# Patient Record
Sex: MALE | Race: WHITE | NOT HISPANIC OR LATINO
[De-identification: names, ages, dates, MRNs, and addresses within clinical notes are randomized per-mention and may not be internally consistent; named-entity substitution may affect disease eponyms.]

---

## 2018-07-17 ENCOUNTER — APPOINTMENT (OUTPATIENT)
Dept: DERMATOLOGY | Facility: CLINIC | Age: 53
End: 2018-07-17
Payer: COMMERCIAL

## 2018-07-17 PROBLEM — Z00.00 ENCOUNTER FOR PREVENTIVE HEALTH EXAMINATION: Status: ACTIVE | Noted: 2018-07-17

## 2018-07-17 PROCEDURE — 17000 DESTRUCT PREMALG LESION: CPT | Mod: 59

## 2018-07-17 PROCEDURE — 99203 OFFICE O/P NEW LOW 30 MIN: CPT | Mod: 25

## 2018-07-17 PROCEDURE — 17110 DESTRUCTION B9 LES UP TO 14: CPT

## 2022-04-17 ENCOUNTER — EMERGENCY (EMERGENCY)
Facility: HOSPITAL | Age: 57
LOS: 1 days | Discharge: HOME | End: 2022-04-17
Attending: EMERGENCY MEDICINE
Payer: COMMERCIAL

## 2022-04-17 VITALS
DIASTOLIC BLOOD PRESSURE: 87 MMHG | RESPIRATION RATE: 16 BRPM | HEART RATE: 75 BPM | HEIGHT: 63 IN | SYSTOLIC BLOOD PRESSURE: 170 MMHG | TEMPERATURE: 98 F | WEIGHT: 149.91 LBS | OXYGEN SATURATION: 100 %

## 2022-04-17 PROCEDURE — 99284 EMERGENCY DEPT VISIT MOD MDM: CPT

## 2022-04-17 PROCEDURE — 73030 X-RAY EXAM OF SHOULDER: CPT

## 2022-04-17 PROCEDURE — 73030 X-RAY EXAM OF SHOULDER: CPT | Mod: 26,RT

## 2022-04-17 PROCEDURE — 73060 X-RAY EXAM OF HUMERUS: CPT | Mod: 26,RT

## 2022-04-17 PROCEDURE — 73060 X-RAY EXAM OF HUMERUS: CPT

## 2022-04-17 PROCEDURE — 99284 EMERGENCY DEPT VISIT MOD MDM: CPT | Mod: 25

## 2022-04-17 NOTE — ED STATDOCS - ATTENDING CONTRIBUTION TO CARE
I, Mickie Jaime, performed the initial face to face bedside interview with this patient regarding history of present illness, review of symptoms and relevant past medical, social and family history.  I completed an independent physical examination.  I was the initial provider who evaluated this patient. I have signed out the follow up of any pending tests (i.e. labs, radiological studies) to the ACP.  I have communicated the patient’s plan of care and disposition with the ACP.

## 2022-04-17 NOTE — ED ADULT NURSE NOTE - OBJECTIVE STATEMENT
Pt is a 57YOM who is here for right shoulder pain, pt states that yesterday he hit his right arm on a steel door and today he is showing signs of bruising, pt has ROM of the extremity but has some bruising to the area today, pt denies any sensory loss.

## 2022-04-17 NOTE — ED ADULT TRIAGE NOTE - CHIEF COMPLAINT QUOTE
pt c/o right shoulder pain inflammation bruising increasing in severity after walking into the steal frame pt on blood thinners states arm increased in size and is turning purple

## 2022-04-17 NOTE — ED STATDOCS - PHYSICAL EXAMINATION
Head: atraumatic, normacephalic  Face: atraumatic, no crepitus no orbiral/maxillary/mandibular ttp  throat: uvula midline no exudates  eyes: perrla eomi  heart: rrr s1s2  lungs: ctab  abd: soft, nt nd +bs no rebound/guarding no cva ttp  skin: warm  UE: Full ROM, no point tenderness, ecchymosis from shoulder to forearm area. 2+ pulses bilaterally   LE: no swelling, no calf ttp  back: no midline cervical/thoracic/lumbar ttp Head: atraumatic, normacephalic  Face: atraumatic, no crepitus no orbiral/maxillary/mandibular ttp  throat: uvula midline no exudates  eyes: perrla eomi  heart: rrr s1s2  lungs: ctab  abd: soft, nt nd +bs no rebound/guarding no cva ttp  skin: warm  UE: Full ROM, no point tenderness, ecchymosis from shoulder to forearm area. 2+ pulses bilaterally  sensation intact  LE: no swelling, no calf ttp  back: no midline cervical/thoracic/lumbar ttp

## 2022-04-17 NOTE — ED STATDOCS - NSFOLLOWUPINSTRUCTIONS_ED_ALL_ED_FT
please follow with orthopedics and with primary care doctor.   monitor for new or worsening symptoms.

## 2022-04-17 NOTE — ED STATDOCS - OBJECTIVE STATEMENT
56 y/o male with PMHx of HTN, DM, high cholesterol, on insulin ,c/o shoulder pain. Patient reports banging his right arm into a steel door last night and noticed having a large bruise this morning. Denies LOC or any other injury. Patient states arm is sore but is able to move to arm/shoulder. Patient came to ER due taking blood thinners but is unable to recall the name. When listing the medication names patient denies taking Aspirin, Coumadin, Eliquis, Xarelto, or Plavix. Daljit hx of TIA, DVT or PE. No allergies or surgical hx.   Denies f/c/n/v/cp/sob/palpitations/cough/rash/headache/dizziness/abd.pain/d/c/dysuria/hematuria 58 y/o male with PMHx of HTN, DM, high cholesterol, on insulin ,c/o right shoulder pain. Patient reports banging his right arm into a steel door last night and noticed having a large bruise this morning. Denies LOC or any other injury. Patient states arm is sore but is able to move to arm/shoulder. Patient came to ER due taking blood thinners but is unable to recall the name. When listing the medication names patient denies taking Aspirin, Coumadin, Eliquis, Xarelto, or Plavix. Daljit hx of TIA, DVT or PE. No allergies or surgical hx.   Denies f/c/n/v/cp/sob/palpitations/cough/rash/headache/dizziness/abd.pain/d/c/dysuria/hematuria

## 2022-04-17 NOTE — ED STATDOCS - NS ED ATTENDING STATEMENT MOD
This was a shared visit with the JAIME. I reviewed and verified the documentation and independently performed the documented:

## 2022-04-17 NOTE — ED STATDOCS - PROGRESS NOTE DETAILS
JANNIE CASILLAS: PT evaluated by intake physician. HPI/PE/ROS as noted above. Will follow up plan per intake physician   no ac. from of extremity. prelim no fracture on film,  dc with fu with orthopedics

## 2022-04-17 NOTE — ED STATDOCS - PATIENT PORTAL LINK FT
You can access the FollowMyHealth Patient Portal offered by [facility] by registering at the following website: [email or web address]. By joining [employer]’s FollowMyHealth portal, you will also be able to view your health information using other applications (apps) compatible with our system.

## 2022-04-17 NOTE — ED STATDOCS - CLINICAL SUMMARY MEDICAL DECISION MAKING FREE TEXT BOX
Shoulder injury with ecchymosis. Patient came to ER for evaluation due to possible anticoagulant medication. Full ROM and no point tenderness. No anticoagulant on medication list, will call pharmacy to see if there is anticoagulant medication. Will get XR of right arm and shoulder. Most likely contusion.

## 2022-04-26 ENCOUNTER — APPOINTMENT (OUTPATIENT)
Dept: ORTHOPEDIC SURGERY | Facility: CLINIC | Age: 57
End: 2022-04-26
Payer: OTHER MISCELLANEOUS

## 2022-04-26 VITALS — WEIGHT: 200 LBS | BODY MASS INDEX: 34.15 KG/M2 | HEIGHT: 64 IN

## 2022-04-26 DIAGNOSIS — E11.9 TYPE 2 DIABETES MELLITUS W/OUT COMPLICATIONS: ICD-10-CM

## 2022-04-26 DIAGNOSIS — Z78.9 OTHER SPECIFIED HEALTH STATUS: ICD-10-CM

## 2022-04-26 PROCEDURE — 99204 OFFICE O/P NEW MOD 45 MIN: CPT

## 2022-04-26 PROCEDURE — 99072 ADDL SUPL MATRL&STAF TM PHE: CPT

## 2022-04-27 ENCOUNTER — APPOINTMENT (OUTPATIENT)
Dept: MRI IMAGING | Facility: CLINIC | Age: 57
End: 2022-04-27
Payer: OTHER MISCELLANEOUS

## 2022-04-27 PROCEDURE — 73221 MRI JOINT UPR EXTREM W/O DYE: CPT | Mod: RT

## 2022-04-27 PROCEDURE — 99072 ADDL SUPL MATRL&STAF TM PHE: CPT

## 2022-04-27 NOTE — DISCUSSION/SUMMARY
[de-identified] : Follow up after MRI of right shoulder to eval rotator cuff tear and proximal biceps tear \par No work until further notice\par Referred patient to physical therapy for strengthening. \par \par Therm-X\par Due to this patient expressing significant pain, I am prescribing an iceless cold/heat compression therapy device for at home use to be used 3-5 times per day at 40 degrees for 35 days. I would like my patient to begin with simultaneous cold & compression therapy at 10mm pressure. At the patients follow up I will determine whether they should continue with cold, or if they should transition to contrast cold/heat compression therapy. Unlike a conventional cold therapy unit that requires ice, the ThermX iceless device is set to a prescribed temperature that it will remain throughout the entire duration of use, whether that be cold compression, heat compression, or contrast compression. Cold therapy units that depend on ice melt over a very short period and do not provide compression which limits the compliance and effectiveness for pain/inflammation reduction that I am targeting for my patient. I have reached out to [facility] to supply this device as they are the exclusive provider of the ThermX and the patient will be contacted and instructed on how to utilize the device.\par  \par \par \par The patient is instructed on a home exercise program.\par \par OTONIEL VIZCARRA Acting as a Scribe for Dr. Yates\par I, Otoniel Vizcarra, attest that this documentation has been prepared under the direction and in the presence of Provider Fransisco Yates MD.\par \par Activity Modification\par The patient was advised to modify their activities.\par \par Dx / Natural History\par The patient was advised of the diagnosis.  The natural history of the pathology was explained in full to the patient in layman's terms.  Several different treatment options were discussed and explained in full to the patient including the risks and benefits of both surgical and non-surgical treatments.  All questions and concerns were answered.\par \par Pain Guide Activities\par The patient was advised to let pain guide the gradual advancement of activities.\par \par RICE\par I explained to the patient that rest, ice, compression, and elevation would benefit them.  They may return to activity after follow-up or when they no longer have any pain.

## 2022-04-27 NOTE — HISTORY OF PRESENT ILLNESS
[de-identified] : The patient is a 57 year old R hand dominant M who presents today complaining of RIGHT SHOULDER PAIN.  Fell down steps at work and his right bicep hit a handrail. Immediate pain and swelling. next day with significant ecchymosis, complains of lump in bicep.\par Date of Injury/Onset: WC - 4/16/2022\par Pain:    At Rest: 5/10 \par With Activity:  10/10 \par Mechanism of injury: WC - FELL DOWN THE STAIRS AT WORK AND SLAMMED INTO DOOR FRAME \par This is NOT a Work Related InjUry being treated under Worker's Compensation.\par This is NOT an athletic injury occurring associated with an interscholastic or organized sports team.\par Quality of symptoms: SHARP , ACHING , THROBBING\par Improves with: REST \par Worse with: LIFTING , REACHING \par Reports Available For Review Today: _\par Out of work/sport: _, since _\par School/Sport/Position/Occupation:  \par Change since last visit: \par Additional Information: None\par  \par

## 2022-04-27 NOTE — PHYSICAL EXAM
[de-identified] : Neurologic: normal coordination, normal DTR UE/LE , normal sensation, normal mood and affect, orientated and able to communicate. \par Skin: normal skin, no rash, no ulcers and no lesions. \par Lymphatic: no obvious lymphadenopathy in areas examined. \par Constitutional: well developed and well nourished. \par Cardiovascular: peripheral vascular exam is grossly normal. \par Pulmonary: no respiratory distress, lungs clear to auscultation bilaterally. \par Abdomen: normal bowel sounds, non-tender, no HSM and no mass.\par \par Right Shoulder: Palpable doris deformity\par Proximal biceps swelling and pain\par Abduction: 90 degrees\par Forward Flexion: 90 degrees\par +neer\par +ruff\par +impingement \par \par Worker's Comp:\par The percentage of impairment is total. The patient cannot return to work. Board authorized health care provider. I provided the services listed above. \par x-ray rt shoulder: 2 views: [place]: normal \par

## 2022-05-03 ENCOUNTER — APPOINTMENT (OUTPATIENT)
Dept: ORTHOPEDIC SURGERY | Facility: CLINIC | Age: 57
End: 2022-05-03
Payer: OTHER MISCELLANEOUS

## 2022-05-03 VITALS — BODY MASS INDEX: 34.15 KG/M2 | HEIGHT: 64 IN | WEIGHT: 200 LBS

## 2022-05-03 DIAGNOSIS — M25.511 PAIN IN RIGHT SHOULDER: ICD-10-CM

## 2022-05-03 DIAGNOSIS — S49.91XA UNSPECIFIED INJURY OF RIGHT SHOULDER AND UPPER ARM, INITIAL ENCOUNTER: ICD-10-CM

## 2022-05-03 DIAGNOSIS — S46.911A STRAIN OF UNSPECIFIED MUSCLE, FASCIA AND TENDON AT SHOULDER AND UPPER ARM LEVEL, RIGHT ARM, INITIAL ENCOUNTER: ICD-10-CM

## 2022-05-03 PROCEDURE — 99072 ADDL SUPL MATRL&STAF TM PHE: CPT

## 2022-05-03 PROCEDURE — 99214 OFFICE O/P EST MOD 30 MIN: CPT

## 2022-05-03 NOTE — PHYSICAL EXAM
[de-identified] : Neurologic: normal coordination, normal DTR UE/LE , normal sensation, normal mood and affect, orientated and able to communicate. \par Skin: normal skin, no rash, no ulcers and no lesions. \par Lymphatic: no obvious lymphadenopathy in areas examined. \par Constitutional: well developed and well nourished. \par Cardiovascular: peripheral vascular exam is grossly normal. \par Pulmonary: no respiratory distress, lungs clear to auscultation bilaterally. \par Abdomen: normal bowel sounds, non-tender, no HSM and no mass.\par \par Right Shoulder: Palpable doris deformity\par Proximal biceps swelling and pain\par Abduction: 90 degrees\par Forward Flexion: 90 degrees\par +neer\par +ruff\par +impingement \par \par Worker's Comp:\par The percentage of impairment is total. The patient cannot return to work. Board authorized health care provider. I provided the services listed above. \par x-ray rt shoulder: 2 views: [place]: normal \par

## 2022-05-03 NOTE — PHYSICAL EXAM
[de-identified] : Neurologic: normal coordination, normal DTR UE/LE , normal sensation, normal mood and affect, orientated and able to communicate. \par Skin: normal skin, no rash, no ulcers and no lesions. \par Lymphatic: no obvious lymphadenopathy in areas examined. \par Constitutional: well developed and well nourished. \par Cardiovascular: peripheral vascular exam is grossly normal. \par Pulmonary: no respiratory distress, lungs clear to auscultation bilaterally. \par Abdomen: normal bowel sounds, non-tender, no HSM and no mass.\par \par Right Shoulder: Palpable doris deformity\par Proximal biceps swelling and pain\par Abduction: 90 degrees\par Forward Flexion: 90 degrees\par +neer\par +ruff\par +impingement \par \par Worker's Comp:\par The percentage of impairment is total. The patient cannot return to work. Board authorized health care provider. I provided the services listed above. \par x-ray rt shoulder: 2 views: [place]: normal \par

## 2022-05-03 NOTE — DISCUSSION/SUMMARY
[de-identified] : Reviewed all images with patient. \par \par The injury is a torn ligament. The incident described by the patient was the competent medical cause of the injury. The patient's complaints are consistent with the injury. The patient's history of the injury is consistent with my objective findings. Board authorized health care provider. I provided the services listed above. Total. \par \par Patient may return to work Monday, May 9th. \par \par Referred patient to physical therapy for strengthening. \par \par Patient will follow up in 4 weeks \par \par -----------------------------------------------\par Home Exercise\par The patient is instructed on a home exercise program.\par \par OTONIEL VIZCARRA Acting as a Scribe for Dr. Yates\par I, Otoniel Vizcarra, attest that this documentation has been prepared under the direction and in the presence of Provider Fransisco Yates MD.\par \par Activity Modification\par The patient was advised to modify their activities.\par \par Dx / Natural History\par The patient was advised of the diagnosis.  The natural history of the pathology was explained in full to the patient in layman's terms.  Several different treatment options were discussed and explained in full to the patient including the risks and benefits of both surgical and non-surgical treatments.  All questions and concerns were answered.\par \par Pain Guide Activities\par The patient was advised to let pain guide the gradual advancement of activities.\par \par RICE\par I explained to the patient that rest, ice, compression, and elevation would benefit them.  They may return to activity after follow-up or when they no longer have any pain.

## 2022-05-03 NOTE — ASSESSMENT
[FreeTextEntry1] : 4/27/22 MRI RT SHOULDER [facility]\par Impression: \par 1. Acute proximal biceps tendon disruption with retraction into the distal bicipital groove as well as diffuse \par superior labral tearing with surrounding synovitis and moderate glenohumeral joint effusion suggesting recent \par biceps tendon disruption.\par 2. Moderate partial tearing at the supraspinatus tendon insertion over an area measuring 1 cm.\par 3. Mild partial tearing and delamination at the subscapularis tendon insertion.\par 4. Infraspinatus tendinopathy.\par 5. AC joint arthrosis and lateral acromial bone spurs with deforming upon the supraspinatus tendon and muscle \par tendon complex.\par 6. No acute fracture or disproportionate muscle atrophy.

## 2022-05-03 NOTE — DISCUSSION/SUMMARY
[de-identified] : Reviewed all images with patient. \par \par The injury is a torn ligament. The incident described by the patient was the competent medical cause of the injury. The patient's complaints are consistent with the injury. The patient's history of the injury is consistent with my objective findings. Board authorized health care provider. I provided the services listed above. Total. \par \par Patient may return to work Monday, May 9th. \par \par Referred patient to physical therapy for strengthening. \par \par Patient will follow up in 4 weeks \par \par -----------------------------------------------\par Home Exercise\par The patient is instructed on a home exercise program.\par \par OTONIEL VZICARRA Acting as a Scribe for Dr. Yates\par I, Otoniel Vizcarra, attest that this documentation has been prepared under the direction and in the presence of Provider Fransisco Yates MD.\par \par Activity Modification\par The patient was advised to modify their activities.\par \par Dx / Natural History\par The patient was advised of the diagnosis.  The natural history of the pathology was explained in full to the patient in layman's terms.  Several different treatment options were discussed and explained in full to the patient including the risks and benefits of both surgical and non-surgical treatments.  All questions and concerns were answered.\par \par Pain Guide Activities\par The patient was advised to let pain guide the gradual advancement of activities.\par \par RICE\par I explained to the patient that rest, ice, compression, and elevation would benefit them.  They may return to activity after follow-up or when they no longer have any pain.

## 2022-05-31 ENCOUNTER — APPOINTMENT (OUTPATIENT)
Dept: ORTHOPEDIC SURGERY | Facility: CLINIC | Age: 57
End: 2022-05-31

## 2025-07-15 ENCOUNTER — EMERGENCY (EMERGENCY)
Facility: HOSPITAL | Age: 60
LOS: 1 days | End: 2025-07-15
Attending: STUDENT IN AN ORGANIZED HEALTH CARE EDUCATION/TRAINING PROGRAM
Payer: SELF-PAY

## 2025-07-15 VITALS
TEMPERATURE: 98 F | HEART RATE: 99 BPM | SYSTOLIC BLOOD PRESSURE: 102 MMHG | DIASTOLIC BLOOD PRESSURE: 65 MMHG | RESPIRATION RATE: 19 BRPM

## 2025-07-15 PROCEDURE — 99284 EMERGENCY DEPT VISIT MOD MDM: CPT

## 2025-07-15 NOTE — ED ADULT TRIAGE NOTE - CHIEF COMPLAINT QUOTE
Pt BIBA after fall on street at [facility] fair Alive after [facility]. +ETOH, fell forward hit head on cement. Denies LOC, use of anticoagulants. Denies HA, dizziness, SOB, CP, nausea. Pt has no complaints. Small lac noted to bridge of nose. Bleeding under control.

## 2025-07-16 VITALS
TEMPERATURE: 98 F | OXYGEN SATURATION: 97 % | HEART RATE: 98 BPM | SYSTOLIC BLOOD PRESSURE: 165 MMHG | RESPIRATION RATE: 18 BRPM | DIASTOLIC BLOOD PRESSURE: 86 MMHG

## 2025-07-16 LAB
ANION GAP SERPL CALC-SCNC: 18 MMOL/L — HIGH (ref 5–17)
BUN SERPL-MCNC: 13.6 MG/DL — SIGNIFICANT CHANGE UP (ref 8–20)
CALCIUM SERPL-MCNC: 9.5 MG/DL — SIGNIFICANT CHANGE UP (ref 8.4–10.5)
CHLORIDE SERPL-SCNC: 99 MMOL/L — SIGNIFICANT CHANGE UP (ref 96–108)
CO2 SERPL-SCNC: 19 MMOL/L — LOW (ref 22–29)
CREAT SERPL-MCNC: 0.91 MG/DL — SIGNIFICANT CHANGE UP (ref 0.5–1.3)
EGFR: 96 ML/MIN/1.73M2 — SIGNIFICANT CHANGE UP
EGFR: 96 ML/MIN/1.73M2 — SIGNIFICANT CHANGE UP
ETHANOL SERPL-MCNC: 175 MG/DL — HIGH (ref 0–9)
GLUCOSE SERPL-MCNC: 216 MG/DL — HIGH (ref 70–99)
POTASSIUM SERPL-MCNC: 4 MMOL/L — SIGNIFICANT CHANGE UP (ref 3.5–5.3)
POTASSIUM SERPL-SCNC: 4 MMOL/L — SIGNIFICANT CHANGE UP (ref 3.5–5.3)
SODIUM SERPL-SCNC: 136 MMOL/L — SIGNIFICANT CHANGE UP (ref 135–145)

## 2025-07-16 PROCEDURE — 70486 CT MAXILLOFACIAL W/O DYE: CPT

## 2025-07-16 PROCEDURE — 70450 CT HEAD/BRAIN W/O DYE: CPT

## 2025-07-16 PROCEDURE — 90715 TDAP VACCINE 7 YRS/> IM: CPT

## 2025-07-16 PROCEDURE — 99285 EMERGENCY DEPT VISIT HI MDM: CPT | Mod: 25

## 2025-07-16 PROCEDURE — 90471 IMMUNIZATION ADMIN: CPT

## 2025-07-16 PROCEDURE — 72125 CT NECK SPINE W/O DYE: CPT

## 2025-07-16 PROCEDURE — 72125 CT NECK SPINE W/O DYE: CPT | Mod: 26

## 2025-07-16 PROCEDURE — 70450 CT HEAD/BRAIN W/O DYE: CPT | Mod: 26

## 2025-07-16 PROCEDURE — 36415 COLL VENOUS BLD VENIPUNCTURE: CPT

## 2025-07-16 PROCEDURE — 80048 BASIC METABOLIC PNL TOTAL CA: CPT

## 2025-07-16 PROCEDURE — 70486 CT MAXILLOFACIAL W/O DYE: CPT | Mod: 26

## 2025-07-16 PROCEDURE — 80307 DRUG TEST PRSMV CHEM ANLYZR: CPT

## 2025-07-16 NOTE — ED PROVIDER NOTE - PATIENT PORTAL LINK FT
You can access the FollowMyHealth Patient Portal offered by [facility] by registering at the following website: [email or web address]. By joining [employer]’s FollowMyHealth portal, you will also be able to view your health information using other applications (apps) compatible with our system.

## 2025-07-16 NOTE — ED ADULT NURSE NOTE - CHIEF COMPLAINT QUOTE
Pt BIBA after fall on street at [facility] fair Alive after [facility]. +ETOH, fell forward hit head on cement. Denies LOC, use of anticoagulants. Denies HA, dizziness, SOB, CP, nausea. Pt has no complaints. Small lac noted to bridge of nose. Bleeding under control.

## 2025-07-16 NOTE — ED ADULT NURSE NOTE - OBJECTIVE STATEMENT
Received pt in yellow gown with belongings secured prior to receiving. Pt is AOx4 c/o fall. Pt admits to ETOH use, states he tripped and fell, hit face on cement floor. Lac noted to bridge of nose, bleeding controlled. Pt denies anticoagulant use, pt offers no complaints at this time. Respirations even & unlabored. NAD. Safety maintained. Pt made aware of plan of care and verbalized understanding.

## 2025-07-16 NOTE — ED ADULT NURSE REASSESSMENT NOTE - NS ED NURSE REASSESS COMMENT FT1
Patient alert and orientated x4. respirations are even and non-labored. Patient noted with steady gait. belongings returned. stable for d/c

## 2025-07-16 NOTE — ED PROVIDER NOTE - SKIN, MLM
Skin normal color for race, warm, dry and intact. No evidence of rash. Abrasion to left foread/scalp

## 2025-07-16 NOTE — ED ADULT NURSE NOTE - NSFALLRISKINTERV_ED_ALL_ED
Assistance OOB with selected safe patient handling equipment if applicable/Assistance with ambulation/Communicate fall risk and risk factors to all staff, patient, and family/Monitor gait and stability/Monitor for mental status changes and reorient to person, place, and time, as needed/Provide visual cue: yellow wristband, yellow gown, etc/Reinforce activity limits and safety measures with patient and family/Toileting schedule using arm’s reach rule for commode and bathroom/Use of alarms - bed, stretcher, chair and/or video monitoring/Call bell, personal items and telephone in reach/Instruct patient to call for assistance before getting out of bed/chair/stretcher/Non-slip footwear applied when patient is off stretcher/[place] to call system/Physically safe environment - no spills, clutter or unnecessary equipment/Purposeful Proactive Rounding/Room/bathroom lighting operational, light cord in reach

## 2025-07-16 NOTE — ED PROVIDER NOTE - NSFOLLOWUPINSTRUCTIONS_ED_ALL_ED_FT
Alcohol Intoxication    Alcohol intoxication occurs when a person no longer thinks clearly or functions well (becomes impaired) after drinking alcohol. Intoxication can occur with just one drink. The legal definition of alcohol intoxication depends on the amount of alcohol in the blood (blood alcohol concentration, ARPIT). ARPIT of 80–100 mg/dL or higher is commonly considered legally intoxicated. The level of impairment depends on:    The amount of alcohol the person had.  The person's age, gender, and weight.  How often the person drinks.  Whether the person has other medical conditions, such as diabetes, seizures, or a heart condition.    Alcohol intoxication can range from mild to severe. The condition can be dangerous, especially if the person:    Also took certain drugs or prescription medicines.  Drinks a large amount of alcohol in a short period of time (binge drinks).    For women, binge drinking is having four or more drinks at one time.  For men, binge drinking is having five or more drinks at one time.    If you or anyone around you appears intoxicated, speak up and act.    What are the causes?  This condition is caused by drinking alcohol.    What increases the risk?  The following factors may make you more likely to develop this condition:    Peer pressure in young adults.  Difficulty managing stress.  History of drug or alcohol abuse.  Combining alcohol with drugs.  Family history of drug or alcohol abuse.  Low body weight.  Binge drinking.    What are the signs or symptoms?  Symptoms of alcohol intoxication can vary from person to person. Symptoms can be mild, moderate, or severe.    Symptoms of mild alcohol intoxication may include:    Feeling relaxed or sleepy.  Having mild difficulty with coordination, speech, memory, or attention.    Symptoms of moderate alcohol intoxication may include:    Extreme emotions, like anger or sadness.  Moderate difficulty with coordination, speech, memory, or attention.    Symptoms of severe alcohol intoxication may include:    Severe difficulty with coordination, speech, memory, or attention.  Passing out.  Vomiting.  Confusion.  Slow breathing.  Coma.    Intoxication can change quickly from mild to severe. It can cause coma or death, especially in people who are not exposed to alcohol often.    How is this diagnosed?  Your health care provider will ask you how much alcohol you drank and what kind you had. Intoxication may also be diagnosed based on:    Your symptoms and medical history.  A physical exam.  A blood test that measures ARPIT.  A smell of alcohol on your breath.    How is this treated?  Treatment for alcohol intoxication may include:    Being monitored in an emergency department, hospital, or treatment center until your ARPIT comes down and it is safe for you to go home.  IV fluids to prevent or treat loss of fluid in the body (dehydration).  Medicine to treat nausea or vomiting or to get rid of alcohol in the body.  Counseling (brief intervention) about the dangers of using alcohol.  Treatment for substance use disorder.  Oxygen therapy or a breathing machine (ventilator).    Long-term (chronic) exposure to alcohol can have long-term effects on your brain, heart, and gastrointestinal system. These effects can be serious and may also require treatment.    Follow these instructions at home:         Eating and drinking     Do not drink alcohol if:    Your health care provider tells you not to drink.  You are pregnant, may be pregnant, or are planning to become pregnant.  You are under the legal drinking age (21 years old in the [place]  You are taking medicines that should not be taken with alcohol.  You have a medical condition, and alcohol makes it worse.  You need to drive or perform activities that require you to be alert.  You have substance use disorder.  Ask your health care provider if alcohol is safe for you. If your health care provider allows you to drink alcohol, limit how much you have. You may drink:    0–1 drink a day for women.   0–2 drinks a day for men.     Be aware of how much alcohol is in your drink. In the [place] one drink equals one 12 oz bottle of beer (355 mL), one 5 oz glass of wine (148 mL), or one 1½ oz shot of hard liquor (44 mL).  Avoid drinking alcohol on an empty stomach.  Stay hydrated. Drink enough fluid to keep your urine pale yellow. Avoid caffeine because it can dehydrate you.  Avoid drinking more than one drink per hour.  When having multiple drinks, drink water or a non-alcoholic beverage between alcoholic drinks.        General instructions    Take over-the-counter and prescription medicines only as told by your health care provider.  Do not drive after drinking any amount of alcohol. Plan for a designated  or another way to go home.  Have someone responsible stay with you while you are intoxicated. You should not be left alone.  Keep all follow-up visits as told by your health care provider. This is important.    Contact a health care provider if:  You do not feel better after a few days.  You have problems at work, at school, or at home due to drinking.    Get help right away if:  You have any of the following:    Moderate to severe trouble with coordination, speech, memory, or attention.  Trouble staying awake.  Severe confusion.  A seizure.  Light-headedness.  Fainting.  Vomiting bright red blood or material that looks like coffee grounds.  Bloody stool (feces). The blood may make your stool bright red, black, or tarry. It may also smell bad.  Shakiness when trying to stop drinking.  Thoughts about hurting yourself or others.    If you ever feel like you may hurt yourself or others, or have thoughts about taking your own life, get help right away. You can go to your nearest emergency department or call:    Your local emergency services (911 in the [place]  A suicide crisis helpline, such as the National Suicide Prevention Lifeline at 1-957.711.5516. This is open 24 hours a day.    Summary  Alcohol intoxication occurs when a person no longer thinks clearly or functions well after drinking alcohol.  If your health care provider says that alcohol is safe for you, limit alcohol intake to no more than 1 drink a day for women (no drinks if you are pregnant) and 2 drinks a day for men. One drink equals 12 oz of beer, 5 oz of wine, or 1½ oz of hard liquor.  Contact your health care provider if drinking has caused you problems at work, school, or home.  Get help right away if you have thoughts about hurting yourself or others.    ADDITIONAL NOTES AND INSTRUCTIONS    Please follow up with your Primary MD in 24-48 hr.  Seek immediate medical care for any new/worsening signs or symptoms.     Document Released: 9/27/2006 Document Revised: 4/9/2019 Document Reviewed: 4/9/2019  [employer] Patient Education ©2019 [employer] This information is not intended to replace advice given to you by your health care provider. Make sure you discuss any questions you have with your health care provider.      Head Injury, Adult       There are many types of head injuries. Head injuries can be as minor as a small bump, or they can be a serious medical issue. More severe head injuries include:    A jarring injury to the brain (concussion).  A bruise (contusion) of the brain. This means there is bleeding in the brain that can cause swelling.  A cracked skull (skull fracture).  Bleeding in the brain that collects, clots, and forms a bump (hematoma).    After a head injury, most problems occur within the first 24 hours, but side effects may occur up to 7–10 days after the injury. It is important to watch your condition for any changes. You may need to be observed in the emergency department or urgent care, or you may be admitted to the hospital.    What are the causes?  There are many possible causes of a head injury. Serious head injuries may be caused by car accidents, bicycle or motorcycle accidents, sports injuries, falls, or being struck by an object.    What are the symptoms?  Symptoms of a head injury include a contusion, bump, or bleeding at the site of the injury. Other physical symptoms may include:    Headache.  Nausea or vomiting.  Dizziness.  Blurred or double vision.  Being uncomfortable around bright lights or loud noises.  Seizures.  Feeling tired.  Trouble being awakened.  Loss of consciousness.    Mental or emotional symptoms may include:    Irritability.  Confusion and memory problems.  Poor attention and concentration.  Changes in eating or sleeping habits.  Anxiety or depression.    How is this diagnosed?  This condition can usually be diagnosed based on your symptoms, a description of the injury, and a physical exam. You may also have imaging tests done, such as a CT scan or an MRI.    How is this treated?  Treatment for this condition depends on the severity and type of injury you have. The main goal of treatment is to prevent complications and allow the brain time to heal.        Mild head injury    If you have a mild head injury, you may be sent home, and treatment may include:    Observation. A responsible adult should stay with you for 24 hours after your injury and check on you often.  Physical rest.  Brain rest.  Pain medicines.        Severe head injury    If you have a severe head injury, treatment may include:    Close observation. This includes hospitalization with the following care:    Frequent physical exams.  Frequent checks of how your brain and nervous system are working (neurological status).  Checking your blood pressure and oxygen levels.  Medicines to relieve pain, prevent seizures, and decrease brain swelling.  Airway protection and breathing support. This may include using a ventilator.  Treatments that monitor and manage swelling inside the brain.  Brain surgery. This may be needed to:    Remove a collection of blood or blood clots.  Stop the bleeding.  Remove a part of the skull to allow room for the brain to swell.    Follow these instructions at home:      Activity    Rest and avoid activities that are physically hard or tiring.  Make sure you get enough sleep.  Let your brain rest by limiting activities that require a lot of thought or attention, such as:    Watching TV.  Playing memory games and puzzles.  Job-related work or homework.  Working on the computer, using social media, and texting.  Avoid activities that could cause another head injury, such as playing sports, until your health care provider approves. Having another head injury, especially before the first one has healed, can be dangerous.  Ask your health care provider when it is safe for you to return to your regular activities, including work or school. Ask your health care provider for a step-by-step plan for gradually returning to activities.  Ask your health care provider when you can drive, ride a bicycle, or use heavy machinery. Your ability to react may be slower after a brain injury. Do not do these activities if you are dizzy.        Lifestyle     Do not drink alcohol until your health care provider approves. Do not use drugs. Alcohol and certain drugs may slow your recovery and can put you at risk of further injury.  If it is harder than usual to remember things, write them down.  If you are easily distracted, try to do one thing at a time.  Talk with family members or close friends when making important decisions.  Tell your friends, family, a trusted colleague, and  about your injury, symptoms, and restrictions. Have them watch for any new or worsening problems.        General instructions    Take over-the-counter and prescription medicines only as told by your health care provider.  Have someone stay with you for 24 hours after your head injury. This person should watch you for any changes in your symptoms and be ready to seek medical help.  Keep all follow-up visits as told by your health care provider. This is important.    How is this prevented?  Work on improving your balance and strength to avoid falls.  Wear a seat belt when you are in a moving vehicle.  Wear a helmet when riding a bicycle, skiing, or doing any other sport or activity that has a risk of injury.  If you drink alcohol:    Limit how much you use to:    0–1 drink a day for nonpregnant women.  0–2 drinks a day for men.  Be aware of how much alcohol is in your drink. In the [place] one drink equals one 12 oz bottle of beer (355 mL), one 5 oz glass of wine (148 mL), or one 1½ oz glass of hard liquor (44 mL).  Take safety measures in your home, such as:    Removing clutter and tripping hazards from floors and stairways.  Using grab bars in bathrooms and handrails by stairs.  Placing non-slip mats on floors and in bathtubs.  Improving lighting in dim areas.    Where to find more information  Centers for Disease Control and Prevention: www.cdc.gov    Get help right away if:  You have:    A severe headache that is not helped by medicine.  Trouble walking or weakness in your arms and legs.  Clear or bloody fluid coming from your nose or ears.  Changes in your vision.  A seizure.  Increased confusion or irritability.  Your symptoms get worse.  You are sleepier than normal and have trouble staying awake.  You lose your balance.  Your pupils change size.  Your speech is slurred.  Your dizziness gets worse.  You vomit.    These symptoms may represent a serious problem that is an emergency. Do not wait to see if the symptoms will go away. Get medical help right away. Call your local emergency services (911 in the [place] Do not drive yourself to the hospital.    Summary  Head injuries can be minor, or they can be a serious medical issue requiring immediate attention.  Treatment for this condition depends on the severity and type of injury you have.  Have someone stay with you for 24 hours after your injury and check on you often.  Ask your health care provider when it is safe for you to return to your regular activities, including work or school.  Head injury prevention includes wearing a seat belt in a motor vehicle, using a helmet on a bicycle, limiting alcohol use, and taking safety measures in your home.    ADDITIONAL NOTES AND INSTRUCTIONS    Please follow up with your Primary MD in 24-48 hr.  Seek immediate medical care for any new/worsening signs or symptoms.     Document Released: 12/18/2006 Document Revised: 10/30/2020 Document Reviewed: 10/30/2020  [employer] Patient Education ©2019 [employer] This information is not intended to replace advice given to you by your health care provider. Make sure you discuss any questions you have with your health care provider.

## 2025-07-16 NOTE — ED PROVIDER NOTE - CLINICAL SUMMARY MEDICAL DECISION MAKING FREE TEXT BOX
Patient with trip and fall while drinking alcohol    Patient currently awake, alert,  oriented and ambualtory without signs of distres.s